# Patient Record
Sex: MALE | Race: WHITE | Employment: STUDENT | URBAN - METROPOLITAN AREA
[De-identification: names, ages, dates, MRNs, and addresses within clinical notes are randomized per-mention and may not be internally consistent; named-entity substitution may affect disease eponyms.]

---
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
## 2025-05-15 ENCOUNTER — HOSPITAL ENCOUNTER (EMERGENCY)
Age: 20
Discharge: HOME | End: 2025-05-15
Payer: COMMERCIAL

## 2025-05-15 ENCOUNTER — APPOINTMENT (OUTPATIENT)
Dept: GENERAL RADIOLOGY | Age: 20
End: 2025-05-15
Payer: COMMERCIAL

## 2025-05-15 VITALS
OXYGEN SATURATION: 98 % | BODY MASS INDEX: 35.02 KG/M2 | HEIGHT: 67 IN | DIASTOLIC BLOOD PRESSURE: 93 MMHG | TEMPERATURE: 98.2 F | HEART RATE: 88 BPM | RESPIRATION RATE: 18 BRPM | WEIGHT: 223.1 LBS | SYSTOLIC BLOOD PRESSURE: 162 MMHG

## 2025-05-15 DIAGNOSIS — S62.665A CLOSED NONDISPLACED FRACTURE OF DISTAL PHALANX OF LEFT RING FINGER, INITIAL ENCOUNTER: ICD-10-CM

## 2025-05-15 DIAGNOSIS — S01.21XA NASAL LACERATION, INITIAL ENCOUNTER: Primary | ICD-10-CM

## 2025-05-15 PROCEDURE — 73130 X-RAY EXAM OF HAND: CPT

## 2025-05-15 PROCEDURE — 12011 RPR F/E/E/N/L/M 2.5 CM/<: CPT

## 2025-05-15 PROCEDURE — 99283 EMERGENCY DEPT VISIT LOW MDM: CPT

## 2025-05-15 RX ORDER — HYDROXYZINE HYDROCHLORIDE 25 MG/1
25 TABLET, FILM COATED ORAL EVERY 8 HOURS PRN
COMMUNITY
Start: 2024-11-22

## 2025-05-15 ASSESSMENT — PAIN - FUNCTIONAL ASSESSMENT: PAIN_FUNCTIONAL_ASSESSMENT: 0-10

## 2025-05-15 ASSESSMENT — ENCOUNTER SYMPTOMS
RESPIRATORY NEGATIVE: 1
DIARRHEA: 0
RHINORRHEA: 0
SHORTNESS OF BREATH: 0
ABDOMINAL PAIN: 0
PHOTOPHOBIA: 0
SORE THROAT: 0
COLOR CHANGE: 0
NAUSEA: 0
SINUS PAIN: 0
VOICE CHANGE: 0
COUGH: 0
BACK PAIN: 0
VOMITING: 0
FACIAL SWELLING: 0
CONSTIPATION: 0
TROUBLE SWALLOWING: 0

## 2025-05-15 ASSESSMENT — PAIN SCALES - GENERAL: PAINLEVEL_OUTOF10: 4

## 2025-05-15 ASSESSMENT — LIFESTYLE VARIABLES
HOW MANY STANDARD DRINKS CONTAINING ALCOHOL DO YOU HAVE ON A TYPICAL DAY: 1 OR 2
HOW OFTEN DO YOU HAVE A DRINK CONTAINING ALCOHOL: MONTHLY OR LESS

## 2025-05-15 ASSESSMENT — PAIN DESCRIPTION - LOCATION: LOCATION: FINGER (COMMENT WHICH ONE)

## 2025-05-15 NOTE — ED PROVIDER NOTES
[facility] EMERGENCY DEPARTMENT  EMERGENCY DEPARTMENT ENCOUNTER        Pt Name: Monroe Lopez  MRN: [id number]  Birthdate 2005  Date of evaluation: 5/15/2025  Provider: KARLY Huddleston  PCP: No primary care provider on file.  Note Started: 1:06 PM EDT 5/15/25      JONES. I have evaluated this patient.        CHIEF COMPLAINT       Chief Complaint   Patient presents with    Finger Pain     Pt states cut on nose from ice cream falling on head, went to [employer] and hurt right ring finger, worried dislocated       HISTORY OF PRESENT ILLNESS: 1 or more Elements     History From: Patient  Limitations to history : None    Monroe Lopez is a 20 y.o. male with no significant past medical history who presents ED with complaint of a finger injury.  Patient reports he was at work.  States he went to [employer] and states ice cream container fell.  Reports hit him to his face.  He reports he sustained a laceration/abrasion to the bridge of his nose.  He denies any nasal pain.  Denies facial pain.  Denies epistaxis.  Denies decreased eye movement or pain with eye movement.  No visual changes.  No headache.  No neck pain or stiffness.  No ear pain or drainage.  No lightheadedness, dizziness, syncope or near syncope.  No weakness in the arms or legs.  No nausea or vomiting.  Not on blood thinners.  He also injured his left ring finger.  He is concerned it could be dislocated.  He report some edema and ecchymoses at the DIP joint.  Denies decreased range of motion strength.  Denies numbness or tingling.  Denies nail injury.  Denies abrasion or laceration to the hand.  He is right-hand dominant.  Reports immunizations up-to-date.    Nursing Notes were all reviewed and agreed with or any disagreements were addressed in the HPI.    REVIEW OF SYSTEMS :      Review of Systems   Constitutional:  Positive for activity change. Negative for appetite change, chills, diaphoresis, fatigue and fever.   HENT: